# Patient Record
Sex: FEMALE | Race: WHITE | NOT HISPANIC OR LATINO | Employment: STUDENT | URBAN - METROPOLITAN AREA
[De-identification: names, ages, dates, MRNs, and addresses within clinical notes are randomized per-mention and may not be internally consistent; named-entity substitution may affect disease eponyms.]

---
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
## 2020-11-16 RX ORDER — DROSPIRENONE AND ETHINYL ESTRADIOL 0.02-3(28)
1 KIT ORAL DAILY
Qty: 84 TABLET | Refills: 4 | Status: SHIPPED | OUTPATIENT
Start: 2020-11-16 | End: 2023-11-10 | Stop reason: SDUPTHER

## 2020-11-16 RX ORDER — DROSPIRENONE AND ETHINYL ESTRADIOL 0.02-3(28)
1 KIT ORAL
COMMUNITY
End: 2021-11-15 | Stop reason: SDUPTHER

## 2020-11-16 RX ORDER — LISDEXAMFETAMINE DIMESYLATE 50 MG/1
CAPSULE ORAL
COMMUNITY
Start: 2020-09-11

## 2020-11-16 NOTE — TELEPHONE ENCOUNTER
----- Message from Tawnya Nance sent at 11/16/2020  1:12 PM CST -----  Regarding: appoinment  Name of Who is Calling: EZIO MOCTEZUMA [[id number]] What is the request in detail: Patient is requesting a call back to scheduled an appointment. Can the clinic reply by [facility]: No What Number to Call Back [facility]: [phone]

## 2021-11-15 RX ORDER — DROSPIRENONE AND ETHINYL ESTRADIOL 0.02-3(28)
1 KIT ORAL DAILY
Qty: 28 TABLET | Refills: 11 | Status: SHIPPED | OUTPATIENT
Start: 2021-11-15 | End: 2022-11-16 | Stop reason: SDUPTHER

## 2022-11-14 ENCOUNTER — TELEPHONE (OUTPATIENT)
Dept: OBSTETRICS AND GYNECOLOGY | Facility: CLINIC | Age: 22
End: 2022-11-14
Payer: COMMERCIAL

## 2022-11-14 NOTE — TELEPHONE ENCOUNTER
----- Message from Klarissa Abel sent at 11/14/2022  2:15 PM CST -----  Type: RX Refill Request    Who Called: Tara - mom    Refill or New Rx: refill     RX Name and Strength: drospirenone-ethinyl estradioL (NIRAJ) 3-0.02 mg per tablet      Is this a 30 day or 90 day RX:    Preferred Pharmacy with phone number:  [facility] [address] [place] [address] [phone]    Local or Mail Order: local      Would the patient rather a call back or a response via [facility]? Call back     Best Call Back Number: [phone] or [phone]        Additional Information: pt mother would like to schedule her daughters annual.

## 2022-11-16 ENCOUNTER — TELEPHONE (OUTPATIENT)
Dept: OBSTETRICS AND GYNECOLOGY | Facility: CLINIC | Age: 22
End: 2022-11-16
Payer: COMMERCIAL

## 2022-11-16 RX ORDER — DROSPIRENONE AND ETHINYL ESTRADIOL 0.02-3(28)
1 KIT ORAL DAILY
Qty: 28 TABLET | Refills: 11 | Status: SHIPPED | OUTPATIENT
Start: 2022-11-16

## 2022-11-16 NOTE — TELEPHONE ENCOUNTER
----- Message from Maura Hobson sent at 11/16/2022 12:46 PM CST -----  Regarding: Refill Request  Who Called:EZIO MOCTEZUMA [[id number]]      New Prescription or Refill : Refill        RX Name and Strength:  drospirenone-ethinyl estradioL (NIRAJ) 3-0.02 mg per tablet       30 day or 90 day RX: 30        Preferred Pharmacy:[facility] #[id number] [address] [[id number]]        Would the patient rather a call back or a response via [facility]? Call Back           Best Call Back Number:  [phone]           Additional Information: Patient is requesting a call back to schedule a annual sooner than next available in May 2023.  Please assist.

## 2023-11-10 NOTE — TELEPHONE ENCOUNTER
----- Message from Katharine Gee sent at 11/10/2023 12:07 PM CST -----      Name of Who is Calling: EZIO MOCTEZUMA [[id number]]      What is the request in detail: Pt called regarding an appt and med refill.Please contact to further discuss and advise.          Can the clinic reply by [facility]: Y      What [facility] Back [facility]: [phone]

## 2023-11-11 RX ORDER — DROSPIRENONE AND ETHINYL ESTRADIOL 0.02-3(28)
1 KIT ORAL DAILY
Qty: 84 TABLET | Refills: 4 | Status: SHIPPED | OUTPATIENT
Start: 2023-11-11

## 2025-01-06 RX ORDER — DROSPIRENONE AND ETHINYL ESTRADIOL 0.02-3(28)
1 KIT ORAL DAILY
Qty: 84 TABLET | Refills: 4 | Status: SHIPPED | OUTPATIENT
Start: 2025-01-06

## 2025-01-06 NOTE — TELEPHONE ENCOUNTER
----- Message from Katharine sent at 1/6/2025  1:17 PM CST -----      Can the clinic reply in [facility]:Y        Please refill the medication(s) listed below. Please call the patient when the prescription(s) is ready for  at this phone number         Medication #1 drospirenone-ethinyl estradioL (INRAJ) 3-0.02 mg per tablet    Medication #2       Preferred Pharmacy: [facility] #[id number]  [address] [facility] [address] AT [facility]   Phone: [phone]  Fax: [phone]

## 2025-07-18 ENCOUNTER — TELEPHONE (OUTPATIENT)
Dept: OBSTETRICS AND GYNECOLOGY | Facility: CLINIC | Age: 25
End: 2025-07-18
Payer: COMMERCIAL

## 2025-07-18 RX ORDER — DROSPIRENONE AND ETHINYL ESTRADIOL 0.02-3(28)
1 KIT ORAL DAILY
Qty: 84 TABLET | Refills: 4 | Status: SHIPPED | OUTPATIENT
Start: 2025-07-18

## 2025-07-18 NOTE — TELEPHONE ENCOUNTER
Copied from CRM #[id number]. Topic: Appointments - Appointment Scheduling  >> Jul 17, 2025  4:31 PM Avani wrote:  .Name of Who is Calling: Tara                What is the request in detail: Pt mother calling because she want to know if you take her own as a returning patient. For an annual and refill on birth control.Please call back to further assist.                 Can the clinic reply by [facility]: No                What Number to Call Back if not in [facility]: [phone]